# Patient Record
Sex: FEMALE | Race: WHITE | NOT HISPANIC OR LATINO
[De-identification: names, ages, dates, MRNs, and addresses within clinical notes are randomized per-mention and may not be internally consistent; named-entity substitution may affect disease eponyms.]

---

## 2023-02-02 ENCOUNTER — DASHBOARD ENCOUNTERS (OUTPATIENT)
Age: 58
End: 2023-02-02

## 2023-02-02 ENCOUNTER — OFFICE VISIT (OUTPATIENT)
Dept: URBAN - METROPOLITAN AREA CLINIC 128 | Facility: CLINIC | Age: 58
End: 2023-02-02
Payer: COMMERCIAL

## 2023-02-02 VITALS
SYSTOLIC BLOOD PRESSURE: 118 MMHG | BODY MASS INDEX: 27.48 KG/M2 | TEMPERATURE: 97.1 F | DIASTOLIC BLOOD PRESSURE: 70 MMHG | HEIGHT: 66 IN | WEIGHT: 171 LBS

## 2023-02-02 DIAGNOSIS — R10.84 GENERALIZED ABDOMINAL PAIN: ICD-10-CM

## 2023-02-02 DIAGNOSIS — K59.00 CONSTIPATION, UNSPECIFIED CONSTIPATION TYPE: ICD-10-CM

## 2023-02-02 DIAGNOSIS — R19.7 DIARRHEA: ICD-10-CM

## 2023-02-02 PROBLEM — 14760008: Status: ACTIVE | Noted: 2023-02-02

## 2023-02-02 PROCEDURE — 99204 OFFICE O/P NEW MOD 45 MIN: CPT | Performed by: PHYSICIAN ASSISTANT

## 2023-02-02 RX ORDER — HYOSCYAMINE SULFATE 0.12 MG/1
1 TABLET UNDER THE TONGUE AND ALLOW TO DISSOLVE  AS NEEDED TABLET, ORALLY DISINTEGRATING ORAL THREE TIMES A DAY
Qty: 30 | Refills: 0 | OUTPATIENT
Start: 2023-02-02 | End: 2023-03-04

## 2023-02-02 RX ORDER — CHOLESTYRAMINE 4 G/9G
1 PACKET MIXED WITH WATER OR NON-CARBONATED DRINK POWDER, FOR SUSPENSION ORAL TWICE A DAY
Qty: 60 | Refills: 2 | OUTPATIENT
Start: 2023-02-02

## 2023-02-02 NOTE — PHYSICAL EXAM GASTROINTESTINAL
Abdomen , soft, tenderness to palpation was noted in the LUQ and LLQ, nondistended , no guarding or rigidity , no masses palpable , normal bowel sounds, old surgical scars noted, negative Linda's sign, negative CVA tenderness bilaterally Liver and Spleen , no hepatosplenomegaly Rectal deferred

## 2023-02-02 NOTE — HPI-TODAY'S VISIT:
The patient is a new patient who elicits having diarrhea, constipation, and abdominal pain. Location: generalized Patient has had how many bowel movements a day: 1 BM a day currently but she does alternate to constipation and diarrhea usually Duration of symptoms: x for years It is relieved by:  nothing It is aggravated by: nothing Recent antibiotics: yes, last week for UTI Recent travel outside of [place]: none Recent sick contacts: none Current stress: yes  Recent dietary changes: none Recent weight changes: none Last colonoscopy date: 2019 was overall normal EGD: 2019 revealed duodentitis and gastropathy Patient denies a family history of colon polyps or colon cancer

## 2023-02-23 ENCOUNTER — OFFICE VISIT (OUTPATIENT)
Dept: URBAN - METROPOLITAN AREA CLINIC 128 | Facility: CLINIC | Age: 58
End: 2023-02-23

## 2023-03-08 ENCOUNTER — OFFICE VISIT (OUTPATIENT)
Dept: URBAN - METROPOLITAN AREA CLINIC 128 | Facility: CLINIC | Age: 58
End: 2023-03-08

## 2023-03-10 ENCOUNTER — OFFICE VISIT (OUTPATIENT)
Dept: URBAN - METROPOLITAN AREA CLINIC 128 | Facility: CLINIC | Age: 58
End: 2023-03-10